# Patient Record
Sex: MALE | Race: WHITE
[De-identification: names, ages, dates, MRNs, and addresses within clinical notes are randomized per-mention and may not be internally consistent; named-entity substitution may affect disease eponyms.]

---

## 2017-01-25 ENCOUNTER — HOSPITAL ENCOUNTER (OUTPATIENT)
Dept: HOSPITAL 45 - C.RDSM | Age: 47
Discharge: HOME | End: 2017-01-25
Attending: ORTHOPAEDIC SURGERY
Payer: COMMERCIAL

## 2017-01-25 DIAGNOSIS — M25.511: Primary | ICD-10-CM

## 2017-02-01 ENCOUNTER — HOSPITAL ENCOUNTER (OUTPATIENT)
Dept: HOSPITAL 45 - C.MRI | Age: 47
Discharge: HOME | End: 2017-02-01
Attending: ORTHOPAEDIC SURGERY
Payer: COMMERCIAL

## 2017-02-01 DIAGNOSIS — M25.511: Primary | ICD-10-CM

## 2017-02-01 NOTE — DIAGNOSTIC IMAGING REPORT
MRI THE RIGHT SHOULDER NO CONTRAST



CLINICAL HISTORY: Right shoulder pain. History of labral and rotator cuff tear. 

  



COMPARISON STUDY:  Conventional radiographic study dated 1/25/2017



FINDINGS: Imaging was performed in sagittal coronal and axial planes.



There are no findings to indicate occult fracture or bone bruise.



There are no findings to indicate rotator cuff tear. There is no tendinous

retraction.



The bicipital tendon appears intact.



There is abnormal signal within the anterior superior glenoid labrum. It is not

possible to determine whether this relates to prior surgical repair or a

recurrent tear.



There are minor degenerative changes within the chromic clavicular joint



IMPRESSION:  

1. No evidence of full-thickness rotator cuff tear. No evidence of tendinous

retraction

2. Normal bicipital tendon

3. Abnormal appearance of the anterior and superior glenoid labrum. It is not

possible to determine whether this relates to prior surgical repair or whether

this represents a recurrent tear 









Electronically signed by:  Shlomo Magallanes M.D.

2/1/2017 7:19 PM



Dictated Date/Time:  2/1/2017 7:15 PM

## 2017-03-09 LAB
ANION GAP SERPL CALC-SCNC: 8 MMOL/L (ref 3–11)
BASOPHILS # BLD: 0.04 K/UL (ref 0–0.2)
BASOPHILS NFR BLD: 0.6 %
BUN SERPL-MCNC: 17 MG/DL (ref 7–18)
BUN/CREAT SERPL: 15.1 (ref 10–20)
CALCIUM SERPL-MCNC: 8.4 MG/DL (ref 8.5–10.1)
CHLORIDE SERPL-SCNC: 104 MMOL/L (ref 98–107)
CO2 SERPL-SCNC: 26 MMOL/L (ref 21–32)
COMPLETE: YES
CREAT CL PREDICTED SERPL C-G-VRATE: 111.8 ML/MIN
CREAT SERPL-MCNC: 1.1 MG/DL (ref 0.6–1.4)
EOSINOPHIL NFR BLD AUTO: 189 K/UL (ref 130–400)
GLUCOSE SERPL-MCNC: 96 MG/DL (ref 70–99)
HCT VFR BLD CALC: 37.1 % (ref 42–52)
IG%: 0.2 %
IMM GRANULOCYTES NFR BLD AUTO: 32.2 %
INR PPP: 1 (ref 0.9–1.1)
LYMPHOCYTES # BLD: 2.02 K/UL (ref 1.2–3.4)
MCH RBC QN AUTO: 29.2 PG (ref 25–34)
MCHC RBC AUTO-ENTMCNC: 34.5 G/DL (ref 32–36)
MCV RBC AUTO: 84.7 FL (ref 80–100)
MONOCYTES NFR BLD: 11.8 %
NEUTROPHILS # BLD AUTO: 2.2 %
NEUTROPHILS NFR BLD AUTO: 53 %
PARTIAL THROMBOPLASTIN RATIO: 0.9
PMV BLD AUTO: 10.1 FL (ref 7.4–10.4)
POTASSIUM SERPL-SCNC: 4.5 MMOL/L (ref 3.5–5.1)
PROTHROMBIN TIME: 10.4 SECONDS (ref 9–12)
RBC # BLD AUTO: 4.38 M/UL (ref 4.7–6.1)
SODIUM SERPL-SCNC: 138 MMOL/L (ref 136–145)
WBC # BLD AUTO: 6.28 K/UL (ref 4.8–10.8)

## 2017-03-09 NOTE — DIAGNOSTIC IMAGING REPORT
TWO VIEW CHEST



CLINICAL HISTORY: Preoperative examination.



FINDINGS: PA and lateral chest radiographs are compared to study dated

3/11/2015. The examination is degraded by large body habitus. The

cardiomediastinal silhouette is unremarkable.  There are low lung volumes with

bibasilar atelectasis. The lungs and pleural spaces are otherwise clear. There

is no pneumothorax. The bony thorax appears intact.



IMPRESSION: No active disease in the chest.







Electronically signed by:  Michael Alves M.D.

3/9/2017 4:20 PM



Dictated Date/Time:  3/9/2017 4:19 PM

## 2017-03-09 NOTE — PAT MEDICATION INSTRUCTIONS
Service Date


Mar 9, 2017.





Current Home Medication List


Amlodipine Besylate (Amlodipine Besylate), 10 MG PO QAM


Divalproex Sodium (Depakote Etended-Release), 1,500 MG PO HS


Doxazosin Mesylate (Doxazosin Mesylate), 4 MG PO QPM


Hydrochlorothiazide (Hctz), 12.5 MG PO QAM


Ibuprofen (Advil), 800 MG PO PRN


Lisinopril (Lisinopril), 20 MG PO HS


Lurasidone Hcl (Latuda), 20 MG PO HS


Metformin HCl (Metformin HCl), 500 MG PO BIDM


Metoprolol Tartrate (Lopressor) (Lopressor), 50 MG PO BID


Omeprazole (Prilosec), 40 MG PO QPM


Ondansetron (Ondansetron HCl), 4 MG PO Q8 PRN for Nausea


Oxycodone Hcl (Oxycontin), 1 TAB PO BID


[Percocet], 10 MG PO 5XDAY





Medication Instructions


For Your Scheduled Surgery 





Ibuprofen (Advil), 800 MG PO PRN (patient will check with surgeon for 

instructions)





- Hold the following medications 48 hours prior to surgery:


   Metformin HCl (Metformin HCl), 500 MG PO BID





.- Hold the following medications evening prior to surgery:


   Lisinopril (Lisinopril), 20 MG PO HS





- Hold the following medications the morning of surgery:


   Hydrochlorothiazide (Hctz), 12.5 MG PO QAM





- Take the following medications the morning of surgery with a sip of water:


   Oxycodone Hcl (Oxycontin), 1 TAB PO BID (can take up to four hours prior to 

surgery if needed)


   Percocet 10 MG PO 5XDAY (can take up to four hours prior to surgery if needed

)


   Metoprolol Tartrate (Lopressor) (Lopressor), 50 MG PO BID


   Ondansetron (Ondansetron HCl), 4 MG PO Q8 PRN for Nausea (only if needed)


   Amlodipine Besylate (Amlodipine Besylate), 10 MG PO QAM





- Take the following medications as scheduled the night before surgery:


   Oxycodone Hcl (Oxycontin), 1 TAB PO BID


   Percocet 10 MG PO 5XDAY


   Omeprazole (Prilosec), 40 MG PO QPM


   Metoprolol Tartrate (Lopressor) (Lopressor), 50 MG PO BID


   Lurasidone Hcl (Latuda), 20 MG PO HS


   Doxazosin Mesylate (Doxazosin Mesylate), 4 MG PO QPM


   Divalproex Sodium (Depakote Etended-Release), 1,500 MG PO HS














If you have any questions please call us at [phone] or [phone] (

[facility]) or [phone]

## 2017-03-09 NOTE — HISTORY & PHYSICAL EXAMINATION
DATE OF ADMISSION:  04/04/2017

 

CHIEF COMPLAINT:  Right shoulder pain.

 

HISTORY OF PRESENT ILLNESS:  Norbert is a 46-year-old male patient of Dr. Cervantes from [facility] with right shoulder pain that has failed

conservative management.  He admits to pain with any type of use.  His pain

has been longstanding without any obvious injury.  The patient was a heavy

 at one point, now works at a desk job.  Rates his pain 4/10, worse at

8:10.  He denies numbness or tingling distally.

 

PAST MEDICAL HISTORY:

1.  Hypertension.

2.  Anxiety.

3.  GERD.

4.  Obesity.

5.  History of kidney stones.

 

PAST SURGICAL HISTORY:

1.  Right foot plantar fascial release.

2.  Left shoulder arthroscopy x2.

3.  Vasectomy.

4.  Kidney stone removal.

 

SOCIAL HISTORY:  The patient lives at home and lives with his wife in a safe

environment.  Denies any alcohol, tobacco or illegal drug use.

 

FAMILY HISTORY:  Noncontributory.

 

MEDICATIONS:

1.  Mobic 15 mg tab daily.

2.  OxyContin 20 mg tab daily.

3.  Oxycodone 10 mg tab 4-5 times daily.

4.  Hydrochlorothiazide 12.5 mg tab daily.

5.  Latuda 20 mg tab at bedtime.

6.  Metoprolol 50 mg tab twice daily.

7.  Lisinopril 20 mg tab daily.

8.  Amlodipine 5 mg tab daily.

9.  Omeprazole 40 mg tab daily.

10.  Depakote  mg tab 4 tabs at bedtime.

11.  Ativan 1 mg tab as needed for imaging studies.

 

ALLERGIES:  HYDROCHLOROTHIAZIDE.

 

REVIEW OF SYSTEMS:  The patient denies headache, chest pain, shortness of

breath, fevers, chills or night sweats.

 

PHYSICAL EXAMINATION:

GENERAL:  The patient is alert and oriented x3 male, pleasant, appears his

currently stated age, in no acute distress, here today by himself.

CARDIAC:  Regular rate and rhythm.  S1 greater than S2.  No murmurs, rubs or

gallops.

RESPIRATORY:  Lungs are clear to auscultation bilaterally all lung fields. 

No rales, rhonchi or wheezing.

GASTROINTESTINAL:  Abdomen is soft, nontender, nondistended.  Normoactive

bowel sounds all 4 quadrants.

SKIN:  Exam of the patient's right shoulder does not reveal any erythema,

ecchymosis, abrasions, lacerations or skin breakdown.

NEUROVASCULAR:  The right upper extremity distally pulses +2.  Capillary

refill under 2 seconds.  Good sensation with light touch.  Fingers freely

mobile.  +5  strength.

MUSCULOSKELETAL:  The right shoulder forward elevation and adduction 170

degrees, has pain greater than 120 degrees.  The patient is able to

externally rotate 45 degrees and internal rotation is to his buttock. 

Provocative testing of the right shoulder reveals a positive Neer, positive

Wagner, positive cross arm, positive [place]'s, pain with a Jobes test.  The

patient is tender when palpating along the long head of biceps and also his

AC joint.  His elbow is atraumatic.

 

IMPRESSION:  Right shoulder labral tear, acromioclavicular joint

osteoarthritis and long head biceps tendinosis.

 

PLAN:  Norbert will undergo a right shoulder arthroscopy, labral and rotator

cuff repair versus debridement, subacromial decompression, biceps tenotomy,

open distal clavicle excision and exam under anesthesia scheduled for

04/04/2017 at the [facility] to be performed by Dr. Flores. 

Norbert has a preadmission testing appointment on 03/09/2017 at the [facility] in regards to postoperative pain control.  The patient

has already obtained PCP clearance through his family provider, Dr. Amarilis Iyer.  He will obtain preoperative EKG, CBC, electrolytes, BUN,

creatinine, PT/INR.  The patient utilizes pain management and reports that

they will be in charge of providing him with his postoperative narcotics for

pain control which will most likely need to be increased.  Norbert is going to

do physical therapy at [facility] in [place] and order has been provided.  He

will follow up with Dr. Flroes 2 weeks after surgery for suture removal and

postoperative check.  Any other questions or concerns, notify [facility] at [phone].

## 2017-04-04 ENCOUNTER — HOSPITAL ENCOUNTER (OUTPATIENT)
Dept: HOSPITAL 45 - C.ACU | Age: 47
Discharge: HOME | End: 2017-04-04
Attending: ORTHOPAEDIC SURGERY
Payer: COMMERCIAL

## 2017-04-04 VITALS
WEIGHT: 270.29 LBS | WEIGHT: 270.29 LBS | HEIGHT: 70.98 IN | BODY MASS INDEX: 37.84 KG/M2 | BODY MASS INDEX: 37.84 KG/M2 | HEIGHT: 70.98 IN

## 2017-04-04 VITALS
OXYGEN SATURATION: 93 % | SYSTOLIC BLOOD PRESSURE: 121 MMHG | DIASTOLIC BLOOD PRESSURE: 67 MMHG | HEART RATE: 76 BPM | TEMPERATURE: 96.98 F

## 2017-04-04 VITALS
SYSTOLIC BLOOD PRESSURE: 123 MMHG | DIASTOLIC BLOOD PRESSURE: 85 MMHG | TEMPERATURE: 98.42 F | HEART RATE: 74 BPM | OXYGEN SATURATION: 94 %
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
## 2017-04-04 DIAGNOSIS — S46.801A: ICD-10-CM

## 2017-04-04 DIAGNOSIS — Z79.84: ICD-10-CM

## 2017-04-04 DIAGNOSIS — M75.111: Primary | ICD-10-CM

## 2017-04-04 DIAGNOSIS — M19.011: ICD-10-CM

## 2017-04-04 DIAGNOSIS — M77.9: ICD-10-CM

## 2017-04-04 DIAGNOSIS — Z79.899: ICD-10-CM

## 2017-04-04 NOTE — [facility] OPERATIVE REPORT
Operative Report


Operative Date


Apr 4, 2017.





Pre-Operative Diagnosis





Right shoulder labral tear, acromioclavicular joint osteoarthritis, and long 


head biceps tendinosis.





Post-Operative Diagnosis


Same + Rotator cuff partial articular sided tear & Impingement.





Procedure(s) Performed


1) Right shoulder arthroscopy, extensive debridement: labrum (SLAP, Anterior & 

posterior) and rotator cuff.


2) Open distal clavicle excision.


3) Arthroscopic subacromial decompression.


4) Long head of the biceps tenotomy.


5) Exam Under Anesthesia.





Surgeon


Dr. Elie Flores





Assistant Surgeon(s)


Sulaiman Villavicencio - fellow





Estimated Blood Loss


5ML





Findings


The Right shoulder was then examined under anesthesia and it exhibited: Forward 

flexion and abduction to 170; external rotation 95; internal rotation 60.  

There was no noted instability.  Posterior and anterior translation was 1+ and 

they had no sulcus sign and was symmetric to their other side.





The diagnostic arthroscopy commenced with the following findings:   


1.   The biceps anchor showed evidence of a SLAP tear, type I.  There was a 

labral sling around the biceps posteriorly.  With traction on the biceps there 

was movement of the superior and anterior labrum. 


2.   The anterior labrum showed fraying from 1:00 to 3:00 position.  


3.   The inferior labrum was intact. 


4.   The inferior pouch showed no loose bodies.


5.   The posterior labrum had fraying from 10:00 to 11:00.  


6.   The articular surface of the glenoid was intact.


7.   The articular surface of humeral head was intact.


8.   The long Head of the Biceps was intact, again with traction on the biceps 

there was movement of the superior and anterior labrum.


9.   The Subscapularis tendon was intact.


10.   The Supraspinatus tendon had partial thickness articular sided fraying, 

the footprint was otherwise intact.


11.   The Infraspinatus and Teres Minor were intact.


12.   The Subacromial space showed some bursitis and small bony spur.


13.     AC jt showed degenerative changes the distal end of the clavicle.





Fluids


1200





Specimens





right distal clavicle





Drains


none





Anesthesia


general, interscalene nerve block





Complication(s)


None





Disposition


Recovery Room / PACU





Indications


This is a pleasant 46-year-old male who has been having long-standing right 

shoulder pain that has failed conservative management. They have MRI and 

clinical findings suggestive of labral tears, impingement, and AC joint OA. 

After a lengthy discussion regarding their options of conservative versus 

operative management, they have elected to proceed with surgery. The risks of 

surgery were discussed and include but not limited to: Infection, bleeding, 

nerve damage, continued pain, progression of arthritis, stiffness, decreased 

level of activity, and deep vein thrombosis. The patient understood all of 

their options and the risks of surgery and would like to proceed. The informed 

consent was signed.





Description of Procedure


The patient was taken to the operating room and following administration of her 

interscalene nerve block and general anesthetic, a multidisciplinary time-out 

was performed identifying my initials on the right shoulder as the correct and 

operative limb.  The patient was then placed in beach chair position with all 

of their bony prominences well-padded.  They were then prepped and draped in 

the usual orthopedic sterile fashion. 


 


All of the bony landmarks were marked as well as the planned incisions.  The 

planned incisions were injected with a 50:50 mixture of 0.5% Marcaine plain and 

1% Lidocaine with Epinephrine for a total of 8 cc.  Then using a spinal needle 

which was placed intra-articularly into the glenohumeral joint and insufflated 

to 35 cc and there was noted appropriate back flow, an additional 15 cc were 

placed. The standard posterior portal was made with an 11-blade.  Trocar was 

introduced into the glenohumeral joint in the standard fashion.  Using a spinal 

needle, the anterior portal was placed lateral to the coracoid under direct 

visualization between the Long Head of the Biceps and Subscapularis.  A 7mm 

cannula was then placed. 





The intra-articular portion of shoulder was addressed first with debriding any 

fraying from the labrum, subscapularis and supraspinatus tear. These were 

probed and found to be intact. The arthroscope had also been removed from the 

posterior portal and placed anteriorly for better posterior visualization.  

Additional debridement of the posterior labrum and rotator cuff was also 

performed again back to a stable rim.  He was felt that the biceps traction on 

the labral complex would be a source of pain and a biceps tenotomy was 

performed.  The arthroscope was then placed subacromially. There was bursitis 

noted. A lateral portal was created under direct visualization with a spinal 

needle. Once the bursitis was removed, the bursal side of the rotator cuff was 

intact.  There was a small bony spur.  Using the 5 mm barrel-shaped bur, a 5 mm 

subacromial decompression was performed.  





All of the instruments were removed.  Our attention was drawn to the AC joint 

and making a 3 cm incision in-line with the anterior portal incision was made 

and carried down to the Superior AC joint ligament.  A longitudinal incision 

was made in-line with the fibers of the superior AC joint ligament.  The 

posterior and anterior aspect of the clavicle was exposed and using a sagittal 

saw the distal 7 mm was removed.  A rasp was used to smooth out the edges.  The 

wound was copiously irrigated. Bone wax was placed along the exposed bone.  

There was adequate space.





The Superior AC joint ligament was closed with 0 Vicryl.  The subcutaneous 

layer was closed with 3-0 Vicryl.  The skin was closed with a running 

subcuticular stitch using 3-0 Prolene.  Steri strips were placed over top.  The 

portal sites were closed with 3-0 Prolene in a standard fashion.  Xeroform was 

placed overtop followed by 4 x 4's, ABDs, and foam tape.  The patient was 

placed in a sling. The sponge and needle counts were correct. 


 


POSTOPERATIVE INSTRUCTIONS: The patient will follow-up with physical therapy in 

two days. The patient will wear sling out in public and for comfort only. The 

patient will follow-up with me in 10 to 15 days.


I attest to the content of the Intraoperative Record and any orders documented 

therein.  Any exceptions are noted below.

## 2017-04-04 NOTE — [facility] POST OPERATIVE BRIEF NOTE
Immediate Operative Summary


Operative Date


Apr 4, 2017.





Pre-Operative Diagnosis





Right shoulder labral tear, acromioclavicular joint osteoarthritis, and long 


head biceps tendinosis.





Post-Operative Diagnosis





Right shoulder labral tear, acromioclavicular joint osteoarthritis and long 

head 


biceps tendinosis





Procedure(s) Performed





Right Shoulder Arthroscopy, Labral/Rotator Cuff Debridement, Subacromial 


Decompression, Biceps Tenotomy, Open Distal Clavicle Excision





Surgeon


Dr. Elie Flores





Assistant Surgeon(s)


Sulaiman Villavicencio - fellow





Estimated Blood Loss


5





Findings


same





Specimens





right distal clavicle





Drains


none





Anesthesia


general, block





Complication(s)


None





Disposition


Recovery Room / PACU

## 2017-04-04 NOTE — DISCHARGE INSTRUCTIONS
Discharge Instructions


Date of Service


Apr 4, 2017.





Admission


Reason for Admission:  Right Shoulder Labral Tear, Acromioclavicular Join





Discharge


Discharge Diagnosis / Problem:  s/p Right shoulder arthroscopy, rotator cuff 

and labral debridement





Discharge Goals


Goal(s):  Decrease discomfort, Improve function, Increase independence





Activity Recommendations


Activity Limitations:  as noted below


Lifting Limitations:  gradually increase as tolerated


Exercise/Sports Limitations:  none


May Resume Sexual Activity:  when tolerated


Shower/Bathe:  keep incision dry


Driving or Machine Use:  when cleared by Dr. Flores





.





Instructions / Follow-Up


Instructions / Follow-Up





DIET:





*  Resume previous diet.








MEDICATIONS:





*  Please take your prescriptions as instructed at your pre-op appointment and/

or see


   medication discharge instructions listed above.





*  If concerns develop, call your physician's office at [phone].








SPECIAL CARE INSTRUCTIONS:





*  Ice/Elevate as instructed.





*  Keep dressing clean, dry, intact.





*  Your surgical extremity may be discolored due to prepping agents used on the 

skin.  


   A bluish-green tint is a normal variant and should not cause alarm.





Call your doctor at [phone] if:





*  Temperature above 101 degrees





*  Pain not relieved by pain medicine ordered





*  There is increased drainage or redness from any incision





*  You have any unanswered questions, problems or concerns.








FOLLOW UP VISIT:





*  If not already scheduled, please call the office at [phone] to 

schedule a follow-up


   appointment.





Current Hospital Diet


Patient's current hospital diet:





Discharge Diet


Recommended Diet:  Regular Diet





Procedures


Procedures Performed:  


Right Shoulder Arthroscopy, Labral/Rotator Cuff Debridement, Subacromial 


Decompression, Biceps Tenotomy, Open Distal Clavicle Excision





Pending Studies


Studies pending at discharge:  yes


List of pending studies:  


right distal clavicle specimen





Medical Emergencies








.


Who to Call and When:





Medical Emergencies:  If at any time you feel your situation is an emergency, 

please call [phone] immediately.





.





Non-Emergent Contact


Non-Emergency issues call your:  Primary Care Provider


.





"Provider Documentation" section prepared by Ra Arcos.





VTE Core Measure


Inpt VTE Proph given/why not?:  T.E.D. Stockings





[place] Drug Monitoring Program


Search Results:  no issues identified

## 2017-04-04 NOTE — ANESTHESIOLOGY PROGRESS NOTE
Anesthesia Post Op Note


Date & Time


Apr 4, 2017 at 10:58





Vital Signs


Pain Intensity:  0





 Vital Signs Past 12 Hours








  Date Time  Temp Pulse Resp B/P Pulse Ox O2 Delivery O2 Flow Rate FiO2


 


4/4/17 10:15 36.1 76 26 121/67 93 Nasal Cannula 3 


 


4/4/17 10:05  68 25 105/68 91 Nasal Cannula 3 


 


4/4/17 09:55  65 21 100/60 99 Mask 10 


 


4/4/17 09:45  61 25 105/65 99 Mask 10 


 


4/4/17 09:38 36.0 58 16 110/73 99 Mask 10 


 


4/4/17 05:47 36.9 74 18 123/85 94 Room Air  











Notes


Mental Status:  alert / awake / arousable, participated in evaluation


Pt Amnestic to Procedure:  Yes


Nausea / Vomiting:  adequately controlled


Pain:  adequately controlled


Airway Patency, RR, SpO2:  stable & adequate


BP & HR:  stable & adequate


Hydration State:  stable & adequate


Anesthetic Complications:  no major complications apparent

## 2018-01-29 ENCOUNTER — HOSPITAL ENCOUNTER (EMERGENCY)
Dept: HOSPITAL 45 - C.EDB | Age: 48
LOS: 1 days | Discharge: HOME | End: 2018-01-30
Payer: COMMERCIAL

## 2018-01-29 VITALS
WEIGHT: 265.44 LBS | BODY MASS INDEX: 37.16 KG/M2 | HEIGHT: 70.98 IN | WEIGHT: 265.44 LBS | BODY MASS INDEX: 37.16 KG/M2 | HEIGHT: 70.98 IN

## 2018-01-29 DIAGNOSIS — K21.9: ICD-10-CM

## 2018-01-29 DIAGNOSIS — Z84.1: ICD-10-CM

## 2018-01-29 DIAGNOSIS — E11.9: ICD-10-CM

## 2018-01-29 DIAGNOSIS — F31.9: ICD-10-CM

## 2018-01-29 DIAGNOSIS — I10: ICD-10-CM

## 2018-01-29 DIAGNOSIS — F41.9: ICD-10-CM

## 2018-01-29 DIAGNOSIS — Z82.49: ICD-10-CM

## 2018-01-29 DIAGNOSIS — R51: Primary | ICD-10-CM

## 2018-01-29 DIAGNOSIS — Z79.84: ICD-10-CM

## 2018-01-29 LAB
BASOPHILS # BLD: 0.03 K/UL (ref 0–0.2)
BASOPHILS NFR BLD: 0.4 %
BUN SERPL-MCNC: 11 MG/DL (ref 7–18)
CALCIUM SERPL-MCNC: 8.8 MG/DL (ref 8.5–10.1)
CO2 SERPL-SCNC: 26 MMOL/L (ref 21–32)
CREAT SERPL-MCNC: 0.92 MG/DL (ref 0.6–1.4)
EOS ABS #: 0.1 K/UL (ref 0–0.5)
EOSINOPHIL NFR BLD AUTO: 179 K/UL (ref 130–400)
GLUCOSE SERPL-MCNC: 88 MG/DL (ref 70–99)
HCT VFR BLD CALC: 39.8 % (ref 42–52)
HGB BLD-MCNC: 13.7 G/DL (ref 14–18)
IG#: 0.02 K/UL (ref 0–0.02)
IMM GRANULOCYTES NFR BLD AUTO: 32.3 %
LYMPHOCYTES # BLD: 2.18 K/UL (ref 1.2–3.4)
MCH RBC QN AUTO: 29.2 PG (ref 25–34)
MCHC RBC AUTO-ENTMCNC: 34.4 G/DL (ref 32–36)
MCV RBC AUTO: 84.9 FL (ref 80–100)
MONO ABS #: 0.83 K/UL (ref 0.11–0.59)
MONOCYTES NFR BLD: 12.3 %
NEUT ABS #: 3.58 K/UL (ref 1.4–6.5)
NEUTROPHILS # BLD AUTO: 1.5 %
NEUTROPHILS NFR BLD AUTO: 53.2 %
PMV BLD AUTO: 10.1 FL (ref 7.4–10.4)
POTASSIUM SERPL-SCNC: 4 MMOL/L (ref 3.5–5.1)
RED CELL DISTRIBUTION WIDTH CV: 13.2 % (ref 11.5–14.5)
RED CELL DISTRIBUTION WIDTH SD: 40.7 FL (ref 36.4–46.3)
SODIUM SERPL-SCNC: 137 MMOL/L (ref 136–145)
WBC # BLD AUTO: 6.74 K/UL (ref 4.8–10.8)

## 2018-01-29 NOTE — DIAGNOSTIC IMAGING REPORT
CT HEAD WITHOUT CONTRAST (CT)



CLINICAL HISTORY: Headache, hypertension    



COMPARISON STUDY:  10/14/2014



TECHNIQUE:  Axial CT of the brain is performed from the vertex to the skull

base. IV contrast was not administered for this examination. A dose lowering

technique was utilized adhering to the principles of ALARA.

 



CT DOSE: 614.27 mGy.cm



FINDINGS:



No intra or extra-axial mass lesions are visualized. There is no CT evidence of

acute cortical infarction. There is no evidence of midline shift. There is no

acute  hemorrhage. No calvarial fractures are visualized. 

   

There is no evidence of pathologic ventricular dilatation.

There is no evidence of acute sinusitis



IMPRESSION: No acute intracranial findings







Electronically signed by:  Shlomo Magallanes M.D.

1/29/2018 9:42 PM



Dictated Date/Time:  1/29/2018 9:41 PM

## 2018-01-30 VITALS
SYSTOLIC BLOOD PRESSURE: 160 MMHG | OXYGEN SATURATION: 96 % | DIASTOLIC BLOOD PRESSURE: 90 MMHG | HEART RATE: 60 BPM | TEMPERATURE: 97.7 F

## 2018-01-30 NOTE — EMERGENCY ROOM VISIT NOTE
History


Report prepared by Chuy:  Ronny Guy


Under the Supervision of:  Dr. Jaziel Donahue M.D.


First contact with patient:  20:19


Chief Complaint:  HEADACHE


Stated Complaint:  SEVERE HEADACHE, NECK STIFFNESS, LIGHT SENSITIVITY





History of Present Illness


The patient is a 47 year old male who presents to the Emergency Room with 

complaints of a constant headache that began yesterday. He rates his discomfort 

as an 8/10 in severity. The patient states that his headache is worsened with 

light and relieved with laying down. The patient states that the pain is 

located in his entire head. He reports that he has also been experiencing neck 

stiffness and nausea since yesterday. He states that he took his blood pressure 

last night, which was 187/96. The patient states that he took extra Lisinopril 

without any relief. The patient states that he has a history of headaches, 

which he sees a neurologist for. He states that she tried to give him migraine 

medications and had a spinal tap done. The patient states that the spinal tap 

in 2016 was normal. He reports he also has a history of three shoulder 

surgeries. The patient denies trauma, LOC, fevers, chills, diaphoresis, visual 

changes, chest pain, breathing difficulties, vomiting, abdominal pain, back pain

, melena, hematochezia, urinary symptoms, numbness, weakness, lymphadenopathy, 

rash, or other complaints.





   Source of History:  patient


   Onset:  yesterday


   Position:  head


   Symptom Intensity:  8/10


   Quality:  ache


   Timing:  constant


   Modifying Factors (Worsening):  other (light)


   Modifying Factors (Relieving):  other (laying down)


   Associated Symptoms:  + neck pain (stiffness), + nausea





Review of Systems


See HPI for pertinent positives and negatives.  A total of ten systems were 

reviewed and were otherwise negative.





Past Medical & Surgical


Medical Problems:


(1) Anxiety disorder


(2) Bipol I, Most Recent Episode (Or Current) Unspecified


(3) Bipolar disorder


(4) Diabetes


(5) GERD (gastroesophageal reflux disease)


(6) HTN (hypertension)


Surgical Problems:


(1) Status post labral repair of shoulder








Family History





Cancer


Gallbladder disease


Hypertension


Kidney disease


Kidney stones





Social History


Smoking Status:  Never Smoker


Alcohol Use:  none


Marital Status:  


Housing Status:  lives with family


Occupation Status:  employed





Current/Historical Medications


Scheduled


Divalproex Sodium (Depakote Etended-Release), 1,500 MG PO HS


Doxazosin Mesylate (Doxazosin Mesylate), 4 MG PO QPM


Lisinopril (Lisinopril), 20 MG PO HS


Lurasidone Hcl (Latuda), 20 MG PO HS


Metformin HCl (Metformin HCl), 500 MG PO BIDM


Metoprolol Tartrate (Lopressor) (Lopressor), 50 MG PO BID


Omeprazole (Prilosec), 40 MG PO QPM


Oxycodone Hcl (Oxycontin), 1 TAB PO BID





Scheduled PRN


Ondansetron (Ondansetron HCl), 4 MG PO Q8 PRN for Nausea





Allergies


Coded Allergies:  


     Bupropion (Verified  Allergy, Unknown, headache, 1/29/18)


     Gemfibrozil (Verified  Allergy, Unknown, decreased kidney function, muscle 

aches, 1/29/18)


 pt





Physical Exam


Vital Signs











  Date Time  Temp Pulse Resp B/P (MAP) Pulse Ox O2 Delivery O2 Flow Rate FiO2


 


1/30/18 00:35 36.5 60 16 160/90 96 Room Air  


 


1/29/18 23:40 36.6 65 16 171/113 96 Room Air  


 


1/29/18 22:41  61  175/113 96 Room Air  


 


1/29/18 22:02  67 16 175/104 97 Room Air  


 


1/29/18 19:25 36.8 92 20 185/93 97 Room Air  











Physical Exam


GENERAL: Awake, alert, uncomfortable appearing, no distress


HENT: Normocephalic, atraumatic.  TM's normal.  Oropharynx unremarkable.


EYES: PERRL. EOMI.  Normal conjunctiva. Sclera non-icteric.


NECK: Supple. No nuchal rigidity.  FROM.  No JVD or bruit.


RESPIRATORY:  CTA


CARDIAC: RRR.  No murmur. 


ABDOMEN: Soft, non distended.  No tenderness to palpation.  No rebound or 

guarding.  No masses.


RECTAL: Deferred.


MUSCULOSKELETAL:  Unremarkable.  Trace lower extremity edema.  No 

discoloration.  Gross motor strength symmetric.  


NEURO: Cranial nerves 2-12 grossly intact.   Normal sensorium.  No sensory or 

motor deficits noted. Speech normal.  No pronator drift.  


SKIN: No rash or jaundice noted.


LYMPH: No adenopathy.





Medical Decision & Procedures


ER Provider


Diagnostic Interpretation:


Radiology results as stated below per my review and radiologist interpretation





CT HEAD WITHOUT CONTRAST (CT)





CLINICAL HISTORY: Headache, hypertension    





COMPARISON STUDY:  10/14/2014





TECHNIQUE:  Axial CT of the brain is performed from the vertex to the skull


base. IV contrast was not administered for this examination. A dose lowering


technique was utilized adhering to the principles of ALARA.


 





CT DOSE: 614.27 mGy.cm





FINDINGS:





No intra or extra-axial mass lesions are visualized. There is no CT evidence of


acute cortical infarction. There is no evidence of midline shift. There is no


acute  hemorrhage. No calvarial fractures are visualized. 


   


There is no evidence of pathologic ventricular dilatation.


There is no evidence of acute sinusitis





IMPRESSION: No acute intracranial findings











Electronically signed by:  Shlomo Magallanes M.D.


1/29/2018 9:42 PM





Dictated Date/Time:  1/29/2018 9:41 PM





Laboratory Results


1/29/18 21:06








Red Blood Count 4.69, Mean Corpuscular Volume 84.9, Mean Corpuscular Hemoglobin 

29.2, Mean Corpuscular Hemoglobin Concent 34.4, Mean Platelet Volume 10.1, 

Neutrophils (%) (Auto) 53.2, Lymphocytes (%) (Auto) 32.3, Monocytes (%) (Auto) 

12.3, Eosinophils (%) (Auto) 1.5, Basophils (%) (Auto) 0.4, Neutrophils # (Auto

) 3.58, Lymphocytes # (Auto) 2.18, Monocytes # (Auto) 0.83, Eosinophils # (Auto

) 0.10, Basophils # (Auto) 0.03





1/29/18 21:06

















Test


  1/29/18


21:06


 


White Blood Count


  6.74 K/uL


(4.8-10.8)


 


Red Blood Count


  4.69 M/uL


(4.7-6.1)


 


Hemoglobin


  13.7 g/dL


(14.0-18.0)


 


Hematocrit 39.8 % (42-52) 


 


Mean Corpuscular Volume


  84.9 fL


()


 


Mean Corpuscular Hemoglobin


  29.2 pg


(25-34)


 


Mean Corpuscular Hemoglobin


Concent 34.4 g/dl


(32-36)


 


Platelet Count


  179 K/uL


(130-400)


 


Mean Platelet Volume


  10.1 fL


(7.4-10.4)


 


Neutrophils (%) (Auto) 53.2 % 


 


Lymphocytes (%) (Auto) 32.3 % 


 


Monocytes (%) (Auto) 12.3 % 


 


Eosinophils (%) (Auto) 1.5 % 


 


Basophils (%) (Auto) 0.4 % 


 


Neutrophils # (Auto)


  3.58 K/uL


(1.4-6.5)


 


Lymphocytes # (Auto)


  2.18 K/uL


(1.2-3.4)


 


Monocytes # (Auto)


  0.83 K/uL


(0.11-0.59)


 


Eosinophils # (Auto)


  0.10 K/uL


(0-0.5)


 


Basophils # (Auto)


  0.03 K/uL


(0-0.2)


 


RDW Standard Deviation


  40.7 fL


(36.4-46.3)


 


RDW Coefficient of Variation


  13.2 %


(11.5-14.5)


 


Immature Granulocyte % (Auto) 0.3 % 


 


Immature Granulocyte # (Auto)


  0.02 K/uL


(0.00-0.02)


 


Anion Gap


  8.0 mmol/L


(3-11)


 


Est Creatinine Clear Calc


Drug Dose 131.0 ml/min 


 


 


Estimated GFR (


American) 114.4 


 


 


Estimated GFR (Non-


American 98.7 


 


 


BUN/Creatinine Ratio 11.6 (10-20) 


 


Calcium Level


  8.8 mg/dl


(8.5-10.1)


 


Lyme Disease IgG Antibody NEG (NEG) 


 


Lyme Disease IgM Antibody NEG (NEG) 





Laboratory results reviewed by me





Medications Administered











 Medications


  (Trade)  Dose


 Ordered  Sig/Haresh


 Route  Start Time


 Stop Time Status Last Admin


Dose Admin


 


 Prochlorperazine


 Edisylate


  (Compazine Inj)  10 mg  NOW  STAT


 IV  1/29/18 20:30


 1/29/18 20:34 DC 1/29/18 21:06


10 MG


 


 Sodium Chloride  1,000 ml @ 


 999 mls/hr  Q1H1M STAT


 IV  1/29/18 20:30


 1/29/18 21:30 DC 1/29/18 21:05


999 MLS/HR


 


 Ketorolac


 Tromethamine


  (Toradol Inj)  10 mg  NOW  STAT


 IV  1/29/18 20:30


 1/29/18 20:34 DC 1/29/18 21:04


10 MG


 


 Dexamethasone


 Sodium Phosphate


  (Decadron Inj)  10 mg  NOW  STAT


 IV  1/29/18 20:30


 1/29/18 20:34 DC 1/29/18 21:03


10 MG


 


 Diphenhydramine


 HCl


  (Benadryl Inj)  25 mg  NOW  STAT


 IV  1/29/18 20:30


 1/29/18 20:34 DC 1/29/18 21:04


25 MG


 


 Hydromorphone HCl


  (Dilaudid Inj)  1 mg  NOW  STAT


 IV  1/29/18 20:36


 1/29/18 20:37 DC 1/29/18 21:11


1 MG


 


 Hydromorphone HCl


  (Dilaudid Inj)  1 mg  NOW  STAT


 IV  1/29/18 22:51


 1/29/18 22:53 DC 1/29/18 23:00


1 MG


 


 Metoprolol


 Tartrate


  (Lopressor Tab)  50 mg  NOW  STAT


 PO  1/29/18 22:51


 1/29/18 22:53 DC 1/29/18 23:00


50 MG


 


 Lisinopril


  (Zestril Tab)  20 mg  NOW  STAT


 PO  1/29/18 22:51


 1/29/18 22:53 DC 1/29/18 23:21


20 MG











ECG


Indication:  other (headache)


Rate (beats per minute):  68


Rhythm:  normal sinus


Findings:  no acute ischemic change, no ectopy





ED Course


2028: The patient was evaluated in room B10. A complete history and physical 

exam was performed.





2030: Ordered Benadryl Injection 25 mg IV, Decadron Injection 10 mg IV, Toradol 

Injection 10 mg IV, Sodium Chloride 1000 ml @ 999 mls/hr IV, Compazine 

Injection 10 mg IV.





2036: Ordered Dilaudid Injection 1 mg IV.





2251: Ordered Lisinopril 20 mg PO, Lopressor Tab 50 mg PO, Dilaudid Injection 1 

mg IV.





2253: I reevaluated the patient and he states that it feels as if his migraine 

was coming back. I ordered blood pressure medication since he is due for his 

daily dose.





0022: I reevaluated the patient. Discussed results and discharge instructions: 

He verbalized understanding and agreement. The patient is ready for discharge.





Medical Decision


Triage Nursing notes reviewed.


The patient's presentation and history were concerning for headache.





Etiologies such as migraine, tumor, headache, sinus thrombosis, temporal 

arteritis, sinusitis, CVA, ICH, SAH, infection, hypertensive crisis, as well as 

others were entertained.    








The patient was evaluated.  He had non-focal neurologic examination.  He has a 

history of headaches.  His blood pressure was elevated.  This was monitored.  

The patient had an unremarkable workup regarding his labs and imaging.  He was 

treated with the above medications and was feeling better.  His blood pressure 

was still elevated and I discussed his evening medications.  He was overdue for 

them.  He was given his metoprolol and lisinopril.  The patient notes he is not 

currently taking Norvasc.  That was stopped several months ago due to his other 

psychiatric medications.  He was given a second dose of pain medication.  He 

felt much better.  I repeated his blood pressure and it had him down.  He was 

still hypertensive.  I asked the patient to monitor his blood pressure so he 

could follow closely with his primary physician.  He felt comfortable.  I do 

not suspect a hypertensive crisis.  I believe he was having pain and this was 

causing his blood pressure to be elevated and he was also overdue for his 

dosing.  





By the evaluation outlined above other emergent etiologies such as those listed 

in the differential, as well as others, were deemed relatively unlikely.  





The patient was educated about the findings as listed above.  All questions 

were answered and the patient was pleased with the treatment.  Return 

instructions were outlined and the patient was discharged in stable condition.  





The patient was referred to his PCP for follow-up for a recheck of the current 

condition.





[place] Drug Monitoring Program


Search Results:  patient reviewed within database, see additional documentation


Drug Monitoring Findings:


On January 2nd, the patient received 120 tablets of 20 mg Oxycodone.





Medication Reconcilliation


Current Medication List:  was personally reviewed by me





Blood Pressure Screening


Patient's blood pressure:  Elevated blood pressure


Blood pressure disposition:  Referred to PCP





Impression





 Primary Impression:  


 Headache


 Additional Impression:  


 HTN (hypertension)





Scribe Attestation


The scribe's documentation has been prepared under my direction and personally 

reviewed by me in its entirety. I confirm that the note above accurately 

reflects all work, treatment, procedures, and medical decision making performed 

by me.





Departure Information


Dispostion


Home / Self-Care





Referrals


Amarilis Iyer M.D. (PCP)





Patient Instructions


My [facility]





Additional Instructions





HEADACHE INSTRUCTIONS:





DO NOT drive, drink alcohol, operate machinery, or perform dangerous activities 

today.  You were given medications in the ER that can affect your ability to 

safely function or operate a vehicle.





Rest today in a quiet, peaceful, dark environment and get a full 8-10 hrs of 

sleep tonight.  





Avoid loud noises, smoke/smoking, alcohol, bright lights, stress, or physical 

exertion today to minimize the chance the headache may return.  





Continue current medications.





Ibuprofen(Motrin, Advil) may be used for fever or pain.  Use 600mg every six 

hours as needed.  Take with food.  Avoid using more than 2400mg in a 24 hour 

period.  Do not use 2400mg per day for more than three consecutive days without 

physician direction.  Prolonged inappropriate use can lead to stomach upset or 

ulcers. 


(AND/OR)


Acetaminophen(Tylenol) may be used for fever or pain.  Use 1000mg every six 

hours as needed.  Avoid using more than 4000mg in a 24 hour period.  





Return to the ER for passing out, worsening headache, vision problems, neck 

stiffness/pain, fevers, vomiting, worsening of your condition, or as needed.  





Follow up with your primary physician in 2-3 days for a recheck of your current 

condition and a check of your blood pressure.





Problem Qualifiers

## 2018-02-15 ENCOUNTER — HOSPITAL ENCOUNTER (OUTPATIENT)
Dept: HOSPITAL 45 - C.RDSM | Age: 48
Discharge: HOME | End: 2018-02-15
Attending: ORTHOPAEDIC SURGERY
Payer: COMMERCIAL
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
## 2018-02-15 DIAGNOSIS — S49.90XA: Primary | ICD-10-CM

## 2018-02-18 ENCOUNTER — HOSPITAL ENCOUNTER (EMERGENCY)
Dept: HOSPITAL 45 - C.EDB | Age: 48
Discharge: HOME | End: 2018-02-18
Payer: COMMERCIAL

## 2018-02-18 VITALS — HEART RATE: 76 BPM | SYSTOLIC BLOOD PRESSURE: 156 MMHG | OXYGEN SATURATION: 96 % | DIASTOLIC BLOOD PRESSURE: 96 MMHG

## 2018-02-18 VITALS
BODY MASS INDEX: 38.79 KG/M2 | WEIGHT: 283.29 LBS | HEIGHT: 71.5 IN | BODY MASS INDEX: 38.79 KG/M2 | WEIGHT: 283.29 LBS | HEIGHT: 71.5 IN

## 2018-02-18 VITALS — TEMPERATURE: 98.06 F

## 2018-02-18 DIAGNOSIS — K21.9: ICD-10-CM

## 2018-02-18 DIAGNOSIS — F31.9: ICD-10-CM

## 2018-02-18 DIAGNOSIS — Z88.8: ICD-10-CM

## 2018-02-18 DIAGNOSIS — M79.604: Primary | ICD-10-CM

## 2018-02-18 DIAGNOSIS — R60.0: ICD-10-CM

## 2018-02-18 DIAGNOSIS — M79.605: ICD-10-CM

## 2018-02-18 DIAGNOSIS — Z79.84: ICD-10-CM

## 2018-02-18 DIAGNOSIS — Z82.49: ICD-10-CM

## 2018-02-18 DIAGNOSIS — I10: ICD-10-CM

## 2018-02-18 DIAGNOSIS — E11.9: ICD-10-CM

## 2018-02-18 DIAGNOSIS — Z84.1: ICD-10-CM

## 2018-02-18 LAB
ALBUMIN SERPL-MCNC: 3.3 GM/DL (ref 3.4–5)
ALP SERPL-CCNC: 78 U/L (ref 45–117)
ALT SERPL-CCNC: 58 U/L (ref 12–78)
AST SERPL-CCNC: 35 U/L (ref 15–37)
BASOPHILS # BLD: 0.03 K/UL (ref 0–0.2)
BASOPHILS NFR BLD: 0.6 %
BUN SERPL-MCNC: 11 MG/DL (ref 7–18)
CALCIUM SERPL-MCNC: 8.6 MG/DL (ref 8.5–10.1)
CO2 SERPL-SCNC: 25 MMOL/L (ref 21–32)
CREAT SERPL-MCNC: 0.89 MG/DL (ref 0.6–1.4)
EOS ABS #: 0.18 K/UL (ref 0–0.5)
EOSINOPHIL NFR BLD AUTO: 197 K/UL (ref 130–400)
GLUCOSE SERPL-MCNC: 90 MG/DL (ref 70–99)
HCT VFR BLD CALC: 39.1 % (ref 42–52)
HGB BLD-MCNC: 13.5 G/DL (ref 14–18)
IG#: 0.01 K/UL (ref 0–0.02)
IMM GRANULOCYTES NFR BLD AUTO: 35.4 %
LYMPHOCYTES # BLD: 1.71 K/UL (ref 1.2–3.4)
MCH RBC QN AUTO: 29.4 PG (ref 25–34)
MCHC RBC AUTO-ENTMCNC: 34.5 G/DL (ref 32–36)
MCV RBC AUTO: 85.2 FL (ref 80–100)
MONO ABS #: 0.66 K/UL (ref 0.11–0.59)
MONOCYTES NFR BLD: 13.7 %
NEUT ABS #: 2.24 K/UL (ref 1.4–6.5)
NEUTROPHILS # BLD AUTO: 3.7 %
NEUTROPHILS NFR BLD AUTO: 46.4 %
PMV BLD AUTO: 9.7 FL (ref 7.4–10.4)
POTASSIUM SERPL-SCNC: 3.9 MMOL/L (ref 3.5–5.1)
PROT SERPL-MCNC: 6.8 GM/DL (ref 6.4–8.2)
RED CELL DISTRIBUTION WIDTH CV: 12.8 % (ref 11.5–14.5)
RED CELL DISTRIBUTION WIDTH SD: 40.1 FL (ref 36.4–46.3)
SODIUM SERPL-SCNC: 140 MMOL/L (ref 136–145)
WBC # BLD AUTO: 4.83 K/UL (ref 4.8–10.8)

## 2018-02-18 NOTE — EMERGENCY ROOM VISIT NOTE
History


Report prepared by Chuy:  Bernardo Schwarz


Under the Supervision of:  DEBBIE VillarealO.


First contact with patient:  17:53


Chief Complaint:  LEG PAIN,LEG INJURY


Stated Complaint:  LOWER LEG/ANKLE SWELLING/PAIN, HEADACHE





History of Present Illness


The patient is a 47 year old male who presents to the Emergency Room with 

complaints of bilateral lower extremity pain that began two days ago. He rates 

his pain a 7/10 in severity. He has a past medical history of hypertension. The 

patient was placed onto Amlodipine 13 days ago and had his Lisinopril dose 

doubled as well. A couple of days ago, the patient began to have bilateral leg 

pain. He noticed that his legs began to swell yesterday, worse than his baseline

, as well as having an associated headache. His pain is exacerbated with 

movement. He denies any fevers, chest pain, shortness of breath, palpitations, 

nausea, vomiting, or diarrhea. He is also taking Metoprolol as well.  Patient 

denies any prior history of DVTs.  No recent trauma or injury.





   Source of History:  patient


   Onset:  two days ago


   Position:  leg (bilateral)


   Symptom Intensity:  7/10


   Quality:  ache


   Timing:  constant


   Modifying Factors (Worsening):  movement


   Associated Symptoms:  + headache, No fevers, No chest pain, No SOB, No nausea

, No vomiting, No diarrhea


Note:


He is experiencing bilateral leg swelling as well.





Review of Systems


See HPI for pertinent positives & negatives. A total of 10 systems reviewed and 

were otherwise negative.





Past Medical & Surgical


Medical Problems:


(1) Anxiety disorder


(2) Bipol I, Most Recent Episode (Or Current) Unspecified


(3) Bipolar disorder


(4) Diabetes


(5) GERD (gastroesophageal reflux disease)


(6) HTN (hypertension)


Surgical Problems:


(1) Status post labral repair of shoulder








Family History





Cancer


Gallbladder disease


Hypertension


Kidney disease


Kidney stones





Social History


Smoking Status:  Never Smoker


Alcohol Use:  none


Marital Status:  


Housing Status:  lives with family


Occupation Status:  employed





Current/Historical Medications


Scheduled


Divalproex Sodium (Depakote Etended-Release), 1,500 MG PO HS


Doxazosin Mesylate (Doxazosin Mesylate), 4 MG PO QPM


Lisinopril (Lisinopril), 20 MG PO HS


Lurasidone Hcl (Latuda), 20 MG PO HS


Metformin HCl (Metformin HCl), 500 MG PO BIDM


Metoprolol Tartrate (Lopressor) (Lopressor), 50 MG PO BID


Omeprazole (Prilosec), 40 MG PO QPM


Oxycodone Hcl (Oxycontin), 1 TAB PO BID





Scheduled PRN


Ondansetron (Ondansetron HCl), 4 MG PO Q8 PRN for Nausea





Allergies


Coded Allergies:  


     Bupropion (Verified  Allergy, Unknown, headache, 2/18/18)


     Gemfibrozil (Verified  Allergy, Unknown, decreased kidney function, muscle 

aches, 2/18/18)


 pt





Physical Exam


Vital Signs











  Date Time  Temp Pulse Resp B/P (MAP) Pulse Ox O2 Delivery O2 Flow Rate FiO2


 


2/18/18 20:43  76 20 156/96 96   


 


2/18/18 19:53  89 18 155/115 98 Room Air  


 


2/18/18 17:20 36.7 103 18 194/93 98 Room Air  











Physical Exam


GENERAL: alert, well appearing, well nourished, no distress, non-toxic, obese


EYE EXAM: normal conjunctiva, PERRL and EOM's grossly intact


OROPHARYNX: no exudate, no erythema, lips, buccal mucosa, and tongue normal and 

mucous membranes are moist


NECK: supple, no nuchal rigidity, no adenopathy, non-tender


LUNGS: Clear to auscultation. Normal chest wall mechanics


HEART: no murmurs, S1 normal and S2 normal 


ABDOMEN: abdomen soft, non-tender, normo-active bowel sounds, no masses, no 

rebound or guarding. 


BACK: Back is symmetrical on inspection and there is no deformity, no midline 

tenderness, no CVA tenderness. 


SKIN: no rashes and no bruising 


UPPER EXTREMITIES: upper extremities are grossly normal. 


LOWER EXTREMITIES: 1+ edema bilaterally. No joint effusion. No rashes or sores.

  Normal pulses bilateral.


NEURO EXAM: Normal sensorium, cranial nerves II-XII grossly intact, normal 

speech,  no gross weakness of arms, no gross weakness of legs.





Medical Decision & Procedures


Laboratory Results


2/18/18 18:30








Red Blood Count 4.59, Mean Corpuscular Volume 85.2, Mean Corpuscular Hemoglobin 

29.4, Mean Corpuscular Hemoglobin Concent 34.5, Mean Platelet Volume 9.7, 

Neutrophils (%) (Auto) 46.4, Lymphocytes (%) (Auto) 35.4, Monocytes (%) (Auto) 

13.7, Eosinophils (%) (Auto) 3.7, Basophils (%) (Auto) 0.6, Neutrophils # (Auto

) 2.24, Lymphocytes # (Auto) 1.71, Monocytes # (Auto) 0.66, Eosinophils # (Auto

) 0.18, Basophils # (Auto) 0.03





2/18/18 18:30

















Test


  2/18/18


18:30


 


White Blood Count


  4.83 K/uL


(4.8-10.8)


 


Red Blood Count


  4.59 M/uL


(4.7-6.1)


 


Hemoglobin


  13.5 g/dL


(14.0-18.0)


 


Hematocrit 39.1 % (42-52) 


 


Mean Corpuscular Volume


  85.2 fL


()


 


Mean Corpuscular Hemoglobin


  29.4 pg


(25-34)


 


Mean Corpuscular Hemoglobin


Concent 34.5 g/dl


(32-36)


 


Platelet Count


  197 K/uL


(130-400)


 


Mean Platelet Volume


  9.7 fL


(7.4-10.4)


 


Neutrophils (%) (Auto) 46.4 % 


 


Lymphocytes (%) (Auto) 35.4 % 


 


Monocytes (%) (Auto) 13.7 % 


 


Eosinophils (%) (Auto) 3.7 % 


 


Basophils (%) (Auto) 0.6 % 


 


Neutrophils # (Auto)


  2.24 K/uL


(1.4-6.5)


 


Lymphocytes # (Auto)


  1.71 K/uL


(1.2-3.4)


 


Monocytes # (Auto)


  0.66 K/uL


(0.11-0.59)


 


Eosinophils # (Auto)


  0.18 K/uL


(0-0.5)


 


Basophils # (Auto)


  0.03 K/uL


(0-0.2)


 


RDW Standard Deviation


  40.1 fL


(36.4-46.3)


 


RDW Coefficient of Variation


  12.8 %


(11.5-14.5)


 


Immature Granulocyte % (Auto) 0.2 % 


 


Immature Granulocyte # (Auto)


  0.01 K/uL


(0.00-0.02)


 


D-Dimer


  380 ug/L FEU


(0-500)


 


Anion Gap


  12.0 mmol/L


(3-11)


 


Est Creatinine Clear Calc


Drug Dose 141.2 ml/min 


 


 


Estimated GFR (


American) 118.0 


 


 


Estimated GFR (Non-


American 101.8 


 


 


BUN/Creatinine Ratio 12.4 (10-20) 


 


Calcium Level


  8.6 mg/dl


(8.5-10.1)


 


Total Bilirubin


  0.3 mg/dl


(0.2-1)


 


Aspartate Amino Transf


(AST/SGOT) 35 U/L (15-37) 


 


 


Alanine Aminotransferase


(ALT/SGPT) 58 U/L (12-78) 


 


 


Alkaline Phosphatase


  78 U/L


()


 


Pro-B-Type Natriuretic Peptide


  321 pg/ml


(0-450)


 


Total Protein


  6.8 gm/dl


(6.4-8.2)


 


Albumin


  3.3 gm/dl


(3.4-5.0)


 


Globulin


  3.5 gm/dl


(2.5-4.0)


 


Albumin/Globulin Ratio 0.9 (0.9-2) 





Laboratory results per my review.





Medications Administered











 Medications


  (Trade)  Dose


 Ordered  Sig/Haresh


 Route  Start Time


 Stop Time Status Last Admin


Dose Admin


 


 Furosemide


  (Lasix Tab)  20 mg  NOW  STAT


 PO  2/18/18 19:30


 2/18/18 19:31 DC 2/18/18 19:51


20 MG


 


 Acetaminophen


  (Tylenol Tab)  1,000 mg  NOW  STAT


 PO  2/18/18 19:40


 2/18/18 19:41 DC 2/18/18 19:51


1,000 MG


 


 Tramadol HCl


  (Ultram Tab)  50 mg  NOW  STAT


 PO  2/18/18 19:40


 2/18/18 19:41 DC 2/18/18 19:51


50 MG











ED Course


1753: The patient was evaluated in room A12A. A complete history and physical 

exam was performed. 





1930: Lasix Tab 20 mg PO





1940: Ultram Tab 50 mg PO, Tylenol Tab 1000 mg PO





2038: Upon reevaluation, the patient is feeling better. I discussed the 

findings and the treatment plan with the patient.  He verbalizes agreement and 

understanding.  He was discharged home.





Medical Decision


Differential diagnosis:





Etiologies such as DVT, musculoskeletal, infection, joint effusion, trauma, 

lymphedema, idiopathic, CHF, as well as others were entertained..








D-dimer negative and low suspicion for DVT, no evidence of septic arthritis, 

cellulitis, no history of findings to suggest CHF.  Discussed with patient most 

likely increased swelling due to initiation of amlodipine recently.  Discussed 

with him close follow up with family doctor for additional management of his 

blood pressure medications.  Discussed, decrease salt intake, adequate hydration

, elevation of legs when at rest, possible use of compression stockings, 

symptoms watch and return for, he verbalized understanding was agreeable with 

plan.  I do not suspect any additional underlying infectious etiology or 

vascular pathology.





Medication Reconcilliation


Current Medication List:  was personally reviewed by me





Blood Pressure Screening


Patient's blood pressure:  Elevated blood pressure


Blood pressure disposition:  Referred to PCP





Impression





 Primary Impression:  


 Bilateral leg pain


 Additional Impression:  


 Bilateral edema of lower extremity





Scribe Attestation


The scribe's documentation has been prepared under my direction and personally 

reviewed by me in its entirety. I confirm that the note above accurately 

reflects all work, treatment, procedures, and medical decision making performed 

by me.





Departure Information


Dispostion


Home / Self-Care





Referrals


Amarilis Iyer M.D.





Forms


HOME CARE DOCUMENTATION FORM,                                                 

               IMPORTANT VISIT INFORMATION





Patient Instructions


ED Lymphedema, My [facility]





Additional Instructions





Please follow up with your family doctor.  Please discuss your medication 

regimen with them.  Please avoid salt in your diet as this could be controlled 

into your high blood pressure.  Please do not add additional salt food.  Please 

avoid processed foods, can foods, daily needs, soda.  Please drink more water.  

Please elevate your feet and legs well at rest help minimize swelling.  You may 

also consider the use of compression stockings of you're going to be on your 

feet for prolonged periods of time.  If you have any worsening pain or swelling

, develop trouble breathing, chest pain, swelling in other extremities, fevers, 

worsening headache, you've any other new concerns, please return the emergency 

room.  Please stop taking your amlodipine at this time as this is likely 

contributing to your leg swelling.





Problem Qualifiers

## 2018-03-05 ENCOUNTER — HOSPITAL ENCOUNTER (OUTPATIENT)
Dept: HOSPITAL 45 - C.PATHSPEC | Age: 48
Discharge: HOME | End: 2018-03-05
Attending: UROLOGY
Payer: COMMERCIAL

## 2018-03-05 DIAGNOSIS — R31.0: ICD-10-CM

## 2018-03-05 DIAGNOSIS — N40.1: Primary | ICD-10-CM

## 2018-03-05 DIAGNOSIS — N20.0: ICD-10-CM

## 2018-03-05 DIAGNOSIS — R32: ICD-10-CM

## 2018-03-19 ENCOUNTER — HOSPITAL ENCOUNTER (OUTPATIENT)
Dept: HOSPITAL 45 - C.CTS | Age: 48
Discharge: HOME | End: 2018-03-19
Attending: UROLOGY
Payer: COMMERCIAL

## 2018-03-19 DIAGNOSIS — R32: ICD-10-CM

## 2018-03-19 DIAGNOSIS — N20.0: Primary | ICD-10-CM

## 2018-03-19 DIAGNOSIS — R31.0: ICD-10-CM

## 2018-03-19 DIAGNOSIS — N40.1: ICD-10-CM

## 2018-03-19 LAB
ALBUMIN SERPL-MCNC: 3.9 GM/DL (ref 3.4–5)
ALP SERPL-CCNC: 94 U/L (ref 45–117)
ALT SERPL-CCNC: 104 U/L (ref 12–78)
AST SERPL-CCNC: 47 U/L (ref 15–37)
BUN SERPL-MCNC: 21 MG/DL (ref 7–18)
CALCIUM SERPL-MCNC: 8.9 MG/DL (ref 8.5–10.1)
CO2 SERPL-SCNC: 26 MMOL/L (ref 21–32)
CREAT SERPL-MCNC: 1.12 MG/DL (ref 0.6–1.4)
GLUCOSE SERPL-MCNC: 127 MG/DL (ref 70–99)
POTASSIUM SERPL-SCNC: 4.3 MMOL/L (ref 3.5–5.1)
PROT SERPL-MCNC: 7.6 GM/DL (ref 6.4–8.2)
SODIUM SERPL-SCNC: 132 MMOL/L (ref 136–145)

## 2018-03-19 NOTE — DIAGNOSTIC IMAGING REPORT
ABD/PELVIS COMBO



CLINICAL HISTORY: 47 years-old Male presenting with R31.0 Gross [id number]

Nephrolithiasis. 



TECHNIQUE: Multidetector CT of the abdomen and pelvis was performed before and

after the administration of intravenous contrast. IV contrast: 119 mL of Optiray

320. A dose lowering technique was used consistent with the principles of ALARA

(as low as reasonably achievable). 



COMPARISON: None.



CT DOSE (mGy.cm): The estimated cumulative dose is 2362.06 mGycm.



FINDINGS:



 topogram: Unremarkable.



Lung bases: Minimal basilar opacities, likely atelectasis. Normal heart size. No

pericardial or pleural effusion. 



Liver: Normal morphology. Density consistent with hepatic steatosis. No focal

lesion. Patent hepatic vasculature.



Biliary: No intrahepatic or extrahepatic biliary ductal dilatation. Normal

gallbladder.



Pancreas: Normal.



Spleen: Normal.



Adrenal glands: Normal.



Kidneys and ureters: Punctate nonobstructing calculus at the lower pole of the

left kidney. No right renal calculus. No hydronephrosis. No solid renal or

urothelial mass. Nonspecific mild perinephric fat stranding. Ureters normal.



Bladder: Normal.



Pelvic organs: Prostate and seminal vesicles normal.



Bowel: Normal appendix. No bowel obstruction.



Peritoneal cavity: No free fluid or intraperitoneal gas.



Lymph nodes: No enlarged lymph nodes in the abdomen or pelvis.



Vasculature: Aorta and IVC patent and normal in caliber.



Abdominal wall: Fat-containing left inguinal hernia.



Musculoskeletal: Left pars defect of L5.



IMPRESSION:

1.  Punctate nonobstructing left nephrolithiasis. No hydronephrosis. No solid

renal or urothelial neoplasm. This does not preclude cystoscopy if clinically

indicated.











Electronically signed by:  Norbert Bernal M.D.

3/19/2018 3:53 PM



Dictated Date/Time:  3/19/2018 3:45 PM

## 2018-04-13 ENCOUNTER — HOSPITAL ENCOUNTER (OUTPATIENT)
Dept: HOSPITAL 45 - C.MRIBC | Age: 48
Discharge: HOME | End: 2018-04-13
Attending: ORTHOPAEDIC SURGERY
Payer: COMMERCIAL

## 2018-04-13 DIAGNOSIS — S43.431A: ICD-10-CM

## 2018-04-13 DIAGNOSIS — M25.511: Primary | ICD-10-CM
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
## 2018-04-13 NOTE — DIAGNOSTIC IMAGING REPORT
R INJECTION SHOULDER PRE MRI



FLUOROSCOPY TIME: 19 seconds



CLINICAL HISTORY: 48 years-old Male with SHOULDER PAIN.  Acute right shoulder

pain



PROCEDURE: After obtaining written informed consent, the patient was placed

supine on the fluoroscopy table. A suitable site for needle insertion was marked

using fluoroscopic guidance. The right shoulder was prepped and draped in the

usual sterile fashion. 1% lidocaine was used for skin, subcutaneous and deep

soft tissue anesthesia. Under intermittent fluoroscopic guidance, a 22 gauge 2.5

inch spinal needle was inserted into the right glenohumeral joint. A total of 10

cc of one-to-one mixture of dilute Magnevist (0.1 cc in 10 cc saline) and

Optiray 300 were injected. The needle was then removed. There were no apparent

complications. The patient was transported to MR for further imaging.



IMPRESSION: Fluoroscopic-guided right shoulder arthrogram without immediate

complication. MR portion of the examination will be dictated separately.







The above report was generated using voice recognition software. It may contain

grammatical, syntax or spelling errors.











Electronically signed by:  Valente Herrera M.D.

4/13/2018 10:59 AM



Dictated Date/Time:  4/13/2018 10:58 AM

## 2018-04-13 NOTE — DIAGNOSTIC IMAGING REPORT
R UPPER EXT JOINT WITH



CLINICAL HISTORY: 48 years-old Male with RIGHT SHOULDER PAIN.  Acute right

shoulder pain



COMPARISON: Right shoulder radiographs 2/15/2018.



TECHNIQUE: Multiplanar, multi sequence MRI of the right shoulder was performed

following the intra-articular administration of gadolinium. 



FINDINGS: 

Study is mildly motion degraded.



ROTATOR CUFF:  Mild tendinosis of the supraspinatus and infraspinatus tendons

without high-grade partial or full-thickness tear identified. No tendon

retraction. Teres minor and subscapularis tendons also appear intact. The

rotator cuff musculature is normal in morphology and signal.  



BICEPS TENDON:  Diminutive morphology of the long head biceps tendon with the

intra-articular portion not well visualized. 



LABRUM:  There is tearing of the superior labrum extending anterior to posterior

with irregularity, tearing and thickening of the anteroinferior labrum as well.

There is no evidence for a paralabral cyst.



GLENOHUMERAL JOINT:  The articular cartilage overlying the glenoid fossa is

normal. There is no loose body or debris present within the glenohumeral joint.



ACROMIOCLAVICULAR JOINT:  Postoperative changes of the distal acromion with

fluid-filled AC joint. Trace subacromial/subdeltoid bursitis. Note is of os

acromiale.



OUTLET SPACES:  The suprascapular notch and quadrilateral space are without

obstructing or space occupying lesions.



BONE MARROW:  No focal abnormality, fracture or marrow occupying lesion.



SOFT TISSUES:  The periarticular soft tissues are unremarkable.



IMPRESSION:  

1. Tear of the superior labrum extending anterior to posterior compatible with

SLAP tear. No displaced labral fragment or paralabral cyst. Additionally, there

is irregularity, tearing and thickening of the anteroinferior labrum.

2. Mild tendinosis of the supraspinatus and infraspinatus tendons without

high-grade partial or full-thickness tear identified.

3. Partial resection of the distal clavicle.

4. Diminutive morphology of the long head biceps tendon without evidence of

acute tear.



The above report was generated using voice recognition software. It may contain

grammatical, syntax or spelling errors.









Electronically signed by:  Valente Herrera M.D.

4/13/2018 11:34 AM



Dictated Date/Time:  4/13/2018 11:21 AM

## 2018-04-17 ENCOUNTER — HOSPITAL ENCOUNTER (OUTPATIENT)
Dept: HOSPITAL 45 - C.RAD | Age: 48
Discharge: HOME | End: 2018-04-17
Attending: UROLOGY
Payer: COMMERCIAL

## 2018-04-17 DIAGNOSIS — N40.1: Primary | ICD-10-CM

## 2018-04-17 LAB
BASOPHILS # BLD: 0.04 K/UL (ref 0–0.2)
BASOPHILS NFR BLD: 0.6 %
BUN SERPL-MCNC: 18 MG/DL (ref 7–18)
CALCIUM SERPL-MCNC: 9.2 MG/DL (ref 8.5–10.1)
CO2 SERPL-SCNC: 25 MMOL/L (ref 21–32)
CREAT SERPL-MCNC: 1.17 MG/DL (ref 0.6–1.4)
EOS ABS #: 0.14 K/UL (ref 0–0.5)
EOSINOPHIL NFR BLD AUTO: 222 K/UL (ref 130–400)
GLUCOSE SERPL-MCNC: 97 MG/DL (ref 70–99)
HCT VFR BLD CALC: 40.5 % (ref 42–52)
HGB BLD-MCNC: 14.3 G/DL (ref 14–18)
IG#: 0.02 K/UL (ref 0–0.02)
IMM GRANULOCYTES NFR BLD AUTO: 34.1 %
LYMPHOCYTES # BLD: 2.35 K/UL (ref 1.2–3.4)
MCH RBC QN AUTO: 29.6 PG (ref 25–34)
MCHC RBC AUTO-ENTMCNC: 35.3 G/DL (ref 32–36)
MCV RBC AUTO: 83.9 FL (ref 80–100)
MONO ABS #: 1.01 K/UL (ref 0.11–0.59)
MONOCYTES NFR BLD: 14.7 %
NEUT ABS #: 3.33 K/UL (ref 1.4–6.5)
NEUTROPHILS # BLD AUTO: 2 %
NEUTROPHILS NFR BLD AUTO: 48.3 %
PMV BLD AUTO: 9.9 FL (ref 7.4–10.4)
POTASSIUM SERPL-SCNC: 5 MMOL/L (ref 3.5–5.1)
RED CELL DISTRIBUTION WIDTH CV: 13.2 % (ref 11.5–14.5)
RED CELL DISTRIBUTION WIDTH SD: 40.2 FL (ref 36.4–46.3)
SODIUM SERPL-SCNC: 133 MMOL/L (ref 136–145)
WBC # BLD AUTO: 6.89 K/UL (ref 4.8–10.8)

## 2018-04-17 NOTE — DIAGNOSTIC IMAGING REPORT
CHEST 2 VIEWS ROUTINE



HISTORY: Preop.  N40.1 Benign prostatic hyperplasia with urinary

[id number]



COMPARISON: Chest 3/9/2017.



FINDINGS: The lungs are clear. Cardiac silhouette is normal in size. No pleural

effusions. No pneumothorax.



IMPRESSION:

No acute process.







Electronically signed by:  Ash Ferrara M.D.

4/17/2018 10:32 AM



Dictated Date/Time:  4/17/2018 10:31 AM

## 2018-04-19 ENCOUNTER — HOSPITAL ENCOUNTER (OUTPATIENT)
Dept: HOSPITAL 45 - C.CPL | Age: 48
Discharge: HOME | End: 2018-04-19
Attending: UROLOGY
Payer: COMMERCIAL

## 2018-04-19 DIAGNOSIS — N40.1: Primary | ICD-10-CM

## 2018-04-30 ENCOUNTER — HOSPITAL ENCOUNTER (OUTPATIENT)
Dept: HOSPITAL 45 - C.ACU | Age: 48
Discharge: HOME | End: 2018-04-30
Attending: UROLOGY
Payer: COMMERCIAL

## 2018-04-30 VITALS
HEART RATE: 64 BPM | TEMPERATURE: 98.42 F | DIASTOLIC BLOOD PRESSURE: 81 MMHG | SYSTOLIC BLOOD PRESSURE: 119 MMHG | OXYGEN SATURATION: 98 %

## 2018-04-30 VITALS
BODY MASS INDEX: 37.18 KG/M2 | WEIGHT: 265.55 LBS | BODY MASS INDEX: 37.18 KG/M2 | HEIGHT: 70.98 IN | HEIGHT: 70.98 IN | BODY MASS INDEX: 37.18 KG/M2 | WEIGHT: 265.55 LBS

## 2018-04-30 VITALS
DIASTOLIC BLOOD PRESSURE: 80 MMHG | OXYGEN SATURATION: 96 % | SYSTOLIC BLOOD PRESSURE: 140 MMHG | HEART RATE: 72 BPM | TEMPERATURE: 98.42 F

## 2018-04-30 VITALS — HEART RATE: 61 BPM | DIASTOLIC BLOOD PRESSURE: 81 MMHG | OXYGEN SATURATION: 100 % | SYSTOLIC BLOOD PRESSURE: 123 MMHG

## 2018-04-30 DIAGNOSIS — R30.0: Primary | ICD-10-CM

## 2018-04-30 DIAGNOSIS — N20.0: ICD-10-CM

## 2018-04-30 DIAGNOSIS — I10: ICD-10-CM

## 2018-04-30 DIAGNOSIS — R31.9: ICD-10-CM

## 2018-04-30 DIAGNOSIS — K21.9: ICD-10-CM

## 2018-04-30 DIAGNOSIS — E11.9: ICD-10-CM

## 2018-04-30 RX ADMIN — FENTANYL CITRATE PRN MCG: 50 INJECTION, SOLUTION INTRAMUSCULAR; INTRAVENOUS at 11:15

## 2018-04-30 RX ADMIN — FENTANYL CITRATE PRN MCG: 50 INJECTION, SOLUTION INTRAMUSCULAR; INTRAVENOUS at 11:21

## 2018-04-30 NOTE — [facility] HISTORY AND PHYSICAL
History


General


Date of Service:


Apr 30, 2018.


Primary Care Physician:


Amarilis Iyer M.D.


Pt seen a urologist before?:  Yes





History of Present Illness


Gross hematuria and bladder issues with LUTS and bother. Patient also passes 

small stones. No major issues. very anxious about procedure.  No major 

problems. Occasional pain in groin in waves moderate and bothersome.





Laboratory


Labs were reviewed and are within normal limits unless listed below. Labs are 

available in the chart and at [facility]





Problem List


Medical Problems:


(1) Bilateral edema of lower extremity


Status: Acute  





(2) Bilateral leg pain


Status: Acute  





(3) Headache


Status: Acute  











Social History


Hx Tobacco Use In Past Year?:  No ( )


Smoking Status:  Never Smoker


Marital status:  


Housing status:  lives with family


Occupation status:  employed





Immunizations


History of Influenza Vaccine:  No


History of Tetanus Vaccine?:  UNKNOWN


History of Pneumococcal:  No


History of Hepatitis B Vaccine:  No





Allergies


Coded Allergies:  


     Bupropion (Verified  Allergy, Unknown, headache, 4/30/18)


     Gemfibrozil (Verified  Allergy, Unknown, decreased kidney function, muscle 

aches, 4/30/18)


 pt


     Tamsulosin (Verified  Allergy, Unknown, HEADACHES, 4/30/18)





Medications


Home Medications:





Home Meds and Scripts








 Medications  Dose


 Route/Sig


 Max Daily Dose Days Date Category Dose


Instructions


 


 Testosterone 1 %


 Gel  2 %


 TOP


    4/30/18 Reported  plans to begin today


 


 Hctz


  (Hydrochlorothiazide)


 25 Mg Tab  25 Mg


 PO HS


    4/26/18 Reported 


 


 Klonopin


  (Clonazepam) 0.5


 Mg Tab  0.5 Mg


 PO PRN


    4/26/18 Reported 


 


 Oxycontin


  (Oxycodone Hcl)


 20 Mg Tab  10 Mg


 PO Q4H PRN


    3/9/17 Reported 


 


 Latuda


  (Lurasidone Hcl)


 40 Mg Tab  20 Mg


 PO HS


    3/9/17 Reported 


 


 Depakote


 Etended-Release


  (Divalproex


 Sodium) 500 Mg


 Tabcr  1,500 Mg


 PO HS


    10/27/16 Reported 


 


 Ondansetron HCl


  (Ondansetron) 4


 Mg Tab  4 Mg


 PO Q8 PRN


    10/27/16 Reported 


 


 Prilosec


  (Omeprazole) 40


 Mg Cap  40 Mg


 PO QPM


    10/27/16 Reported 


 


 Lisinopril 20 Mg


 Tab  40 Mg


 PO HS


    10/27/16 Reported 


 


 Metformin HCl 500


 Mg Tab  500 Mg


 PO BIDM


    10/27/16 Reported 


 


 Lopressor


  (Metoprolol


 Tartrate) 50 Mg


 Tab  50 Mg


 PO BID


    6/2/14 Reported 








Inpatient Medications:





Current Inpatient Medications








 Medications


  (Trade)  Dose


 Ordered  Sig/Haresh


 Route  Start Time


 Stop Time Status Last Admin


Dose Admin


 


 Lactated Ringer's  1,000 ml @ 


 15 mls/hr  Q24H


 IV  4/30/18 06:00


 5/1/18 05:59   


 


 


 Miscellaneous


 Information


  (General Order


 Problem)  1 ea  QS


 N/A  4/26/18 16:00


 5/26/18 15:59   


 











Review of Systems


Review of Systems


All Other Systems:  Reviewed and Negative (All reviewed. See HPI for full report

)





Physical Exam


Vital Signs:





Vital Signs Past 12 Hours








  Date Time  Temp Pulse Resp B/P (MAP) Pulse Ox O2 Delivery O2 Flow Rate FiO2


 


4/30/18 08:38 36.9 72 18 140/80 (100) 96 Room Air  








Physical Exam:


General Appearance:  WD/WN, no apparent distress


Eyes:  bilateral eyes normal inspection


ENT:  normal ENT inspection


Neck:  supple, no JVD


Respiratory/Chest:  no respiratory distress


Cardiovascular:  regular rate, rhythm


Gastrointestinal:  


   Abdomen:  normal abdomen


Extremities:  normal range of motion, normal inspection, no pedal edema


Neurologic/Psychiatric:  CNs II-XII nml as tested, no motor/sensory deficits, 

alert, normal mood/affect, oriented x 3


Skin:  normal color, warm/dry


Lymphatic:  no adenopathy





Assessment & Plan


Assessment & Plan


Assessment & Plan:


1 Gross hematuria


2. Dysuria


3. Nephrolithaisis. 





Plan to do cystoscopy with possible biopsy. discussed at length with patient.  

Patient nervous about procedure, but otherwise no issues or concern. s





Risks and benefits discussed at length.

## 2018-04-30 NOTE — ANESTHESIOLOGY PROGRESS NOTE
Anesthesia Post Op Note


Date & Time


Apr 30, 2018 at 12:06





Vital Signs


Pain Intensity:  5





Vital Signs Past 12 Hours








  Date Time  Temp Pulse Resp B/P (MAP) Pulse Ox O2 Delivery O2 Flow Rate FiO2


 


4/30/18 11:35 36.2 66 16 122/89 97 Room Air  


 


4/30/18 11:25  68 16 123/75 100 Room Air  


 


4/30/18 11:15  71 16 116/83 100 Oxymask 3 


 


4/30/18 11:05  73 16 154/98 100 Oxymask 5 


 


4/30/18 10:55 36.8 70 16 137/93 100 Oxymask 10 


 


4/30/18 08:38 36.9 72 18 140/80 (100) 96 Room Air  











Notes


Mental Status:  alert / awake / arousable, participated in evaluation


Pt Amnestic to Procedure:  Yes


Nausea / Vomiting:  adequately controlled


Pain:  adequately controlled


Airway Patency, RR, SpO2:  stable & adequate


BP & HR:  stable & adequate


Hydration State:  stable & adequate


Anesthetic Complications:  no major complications apparent

## 2018-04-30 NOTE — DISCHARGE INSTRUCTIONS
Discharge Instructions


Date of Service


Apr 30, 2018.





Admission


Reason for Admission:  Benign Prostatic Hyperplasia W/Urinary Obstruction





Discharge


Discharge Diagnosis / Problem:  Gross hematuria





Discharge Goals


Goal(s):  Decrease discomfort, Improve function





Activity Recommendations


Activity Limitations:  resume your previous activity


Lifting Limitations:  gradually increase as tolerated


Exercise/Sports Limitations:  gradually increase as tolerated





.





Instructions / Follow-Up


Instructions / Follow-Up


May have pelvic pain. May have discomfort. May have blood in urine. Call if any 

fevers or chills.





Current Hospital Diet


Patient's current hospital diet:





Discharge Diet


Recommended Diet:  Regular Diet





Procedures


Procedures Performed:  


Cystoscopy





Pending Studies


Studies pending at discharge:  no





Medical Emergencies








.


Who to Call and When:





Medical Emergencies:  If at any time you feel your situation is an emergency, 

please call 911 immediately.





.





Non-Emergent Contact


Non-Emergency issues call your:  Primary Care Provider, Urologist


Call Non-Emergent contact if:  you have a fever, temperature is above 101, 

temperature is above 101.5, your pain is not controlled, your pain is worsening

, your pain is unusual for you





.


.








"Provider Documentation" section prepared by Luis Beard.








.

## 2018-04-30 NOTE — [facility] OPERATIVE REPORT
Operative Report


Operative Date


Apr 30, 2018.





Pre-Operative Diagnosis





Gross Hematuria





Post-Operative Diagnosis





Same





Procedure(s) Performed





Cystoscopy





Surgeon


Chirag





Estimated Blood Loss


Minimal





Findings


NonEnlarged prostate, small bladder capacity. No masses or lesions





Drains


None





Anesthesia Type


MAC





Complication(s)


none





Disposition


Recovery Room / PACU





Indications


Gross hematuria. Risks and benefits Discussed. Negative CT scan.





Description of Procedure


Patient was consented and brought back to the operating room. Patient was 

placed under anesthesia in the supine position and moved to the dorsal 

lithotomy position.  Patient was prepped and draped in the regular sterile 

fashion. A time out was completed.  





A 30degree Cystoscope was placed into the bladder and the entire bladder was 

examined. The UO's were identified.  


The entire bladder was assessed with both 30 and 70 degree lenses.  Images were 

captured. The prostate was also assessed.  Patient had a high bladder neck with 

small prostate and no significant enlargement.  Bladder was small for volume. 

No lesions or issues. Moderate hypervascularity. 





The bladder was emptied.  The scope was removed. The patient was cleaned, 

aroused from anesthesia, and transferred to the pacu in stable condition having 

tolerated the procedure well with no complications. 


I was present and participated in all aspects of the procedure. 





The patient will be monitored in the PACU until transferred.


I attest to the content of the Intraoperative Record and any orders documented 

therein.  Any exceptions are noted below.